# Patient Record
(demographics unavailable — no encounter records)

---

## 2024-10-18 NOTE — HISTORY OF PRESENT ILLNESS
[FreeTextEntry1] : Rianna Traore presented to the office today for a followup evaluation.  She was last seen in the office 6 months ago.     She is now 90 years old, with a history of hypertension. She has had reactions to various blood pressure medications, not all of which she remembers. She has been on Micardis without difficulty. In early 2014, she was started on diltiazem for some additional blood pressure control, though she was not compliant.  She has a history of breast cancer treated with lumpectomy and radiation therapy. She has a history of a thyroid nodule, treated with radioactive iodine. She has a history of an abnormal EKG and paroxysmal tachycardia, diagnosed for some time as WPW syndrome, presenting initially in 2005. She was advised at that time to have an ablation, but followup evaluations with a cardiologist were not remarkable, and she was not given that advice again.  She presented to [facility] in March, 2014 having been doing reasonably well. She developed the sudden onset of a sense of tachycardia which did not go away after about 60 minutes. In the emergency department she was administered adenosine, which converted her back to sinus rhythm. I was asked to see her in consultation in the hospital, and she followed up in the office as well.  Because of her presentation, and because she had some evidence for possible preexcitation on her resting EKG, I sent her to electrophysiology. 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 She was placed on verapamil.    I saw her in the office in February 2023, noting that she had been in the emergency department recently.  She did not feel palpitations the way she usually has.  Because she felt unwell, and eventually did feel some acceleration in her heart rate, she called an ambulance.  She was found to be in a sinus rhythm without abnormalities.  She followed up here.  I felt that she was not having any unstable arrhythmias.  I did know that her blood pressure was elevated.  I recommended an echocardiogram and an extended Holter.  Her monitor revealed up to 10 seconds of SVT, 23 runs in total.  Average heart rate during her longest SVT was 127 bpm.  The fastest lasted only 9 beats, at a heart rate of 182 bpm.   She was seen in the office in June, 2024 and reported several issues.  She had stopped verapamil because of concerns about a rash.  There was an episode of SVT after a longer walk, and I asked her to resume it.  Her dose of Micardis was reduced so as to avoid hypotension.  She presents to the office today having been feeling well.  She reports no chest discomfort or shortness of breath suggestive of angina.  She denies orthopnea, PND.  She has a longstanding tendency toward lower extremity edema, largely on the basis of venous insufficiency.  She typically will use compression socks, but not when the weather is warm.   She has not had any clinically evident episodes of SVT.  She has been seeing a neurologist recently because of some memory issues, and was started on Aricept 5 mg daily, with plans to increase it up to 10.  There are plans to consider the addition of Namenda after that, pending her clinical course.  There were concerns about the potential that it might interact with her SVT and verapamil, so she comes to the office today for evaluation.

## 2024-10-18 NOTE — REASON FOR VISIT
[Consultation] : a consultation regarding [Hypertension] : hypertension [Palpitations] : palpitations [Supraventricular Tachycardia] : supraventricular tachycardia

## 2024-10-18 NOTE — PHYSICAL EXAM
[General Appearance - Well Developed] : well developed [Normal Appearance] : normal appearance [Well Groomed] : well groomed [General Appearance - Well Nourished] : well nourished [No Deformities] : no deformities [General Appearance - In No Acute Distress] : no acute distress [Normal Conjunctiva] : the conjunctiva exhibited no abnormalities [Eyelids - No Xanthelasma] : the eyelids demonstrated no xanthelasmas [Normal Oral Mucosa] : normal oral mucosa [No Oral Pallor] : no oral pallor [No Oral Cyanosis] : no oral cyanosis [Normal Jugular Venous A Waves Present] : normal jugular venous A waves present [Normal Jugular Venous V Waves Present] : normal jugular venous V waves present [No Jugular Venous Fish A Waves] : no jugular venous fish A waves [Respiration, Rhythm And Depth] : normal respiratory rhythm and effort [Exaggerated Use Of Accessory Muscles For Inspiration] : no accessory muscle use [Auscultation Breath Sounds / Voice Sounds] : lungs were clear to auscultation bilaterally [Abdomen Soft] : soft [Abdomen Tenderness] : non-tender [Abdomen Mass (___ Cm)] : no abdominal mass palpated [Abnormal Walk] : normal gait [Gait - Sufficient For Exercise Testing] : the gait was sufficient for exercise testing [Nail Clubbing] : no clubbing of the fingernails [Cyanosis, Localized] : no localized cyanosis [Petechial Hemorrhages (___cm)] : no petechial hemorrhages [Skin Color & Pigmentation] : normal skin color and pigmentation [] : no rash [No Venous Stasis] : no venous stasis [Skin Lesions] : no skin lesions [No Skin Ulcers] : no skin ulcer [No Xanthoma] : no  xanthoma was observed [Oriented To Time, Place, And Person] : oriented to person, place, and time [Affect] : the affect was normal [Mood] : the mood was normal [No Anxiety] : not feeling anxious [Not Palpable] : not palpable [Normal Rate] : normal [Premature Beats] : regular with premature beats [Normal S1] : normal S1 [Normal S2] : normal S2 [No Gallop] : no gallop heard [II] : a grade 2 [1+] : left 1+ [2+] : left 2+ [___ +] : bilateral [unfilled]U+ pretibial pitting edema [Rt] : varicose veins of the right leg noted [Lt] : varicose veins of the left leg noted [FreeTextEntry1] : Red, raised and excoriated region and the right ankle. [S3] : no S3 [S4] : no S4 [Right Carotid Bruit] : no bruit heard over the right carotid [Left Carotid Bruit] : no bruit heard over the left carotid [Right Femoral Bruit] : no bruit heard over the right femoral artery [Left Femoral Bruit] : no bruit heard over the left femoral artery [Bruit] : no bruit heard

## 2024-10-18 NOTE — DISCUSSION/SUMMARY
[FreeTextEntry1] : Mrs. Le has a history of SVT.  She takes verapamil, and overall has had good control over her tendency toward SVT.  She has had no documented recurrences over the last couple of years.  She is without signs or symptoms of significant arrhythmia, heart failure and ischemia.     Echocardiography was performed December 12, 2023.  This revealed a normal ejection fraction.  The aortic root was mildly dilated, and the ascending aorta was dilated, measuring 4.5 cm.  There was mild mitral and minimal aortic regurgitation.  MRA of the chest was performed December 14, 2023.  This revealed prominent measurements of the ascending aorta measuring up to 4.4 cm.  The pulmonary artery was prominent.   She will continue verapamil.  I think that for now, I would not worry about potential interactions with Aricept, and that she can continue, and increase if necessary.  She has an appointment to see me in December, and we will reevaluate her dose of verapamil, and her burden of SVT, at that time. [EKG obtained to assist in diagnosis and management of assessed problem(s)] : EKG obtained to assist in diagnosis and management of assessed problem(s)

## 2024-12-17 NOTE — HISTORY OF PRESENT ILLNESS
[FreeTextEntry1] : Rianna Traore presented to the office today for a followup evaluation.  She was last seen in the office 6 months ago.     She is now 90 years old, with a history of hypertension. She has had reactions to various blood pressure medications, not all of which she remembers. She has been on Micardis without difficulty. In early 2014, she was started on diltiazem for some additional blood pressure control, though she was not compliant.  She has a history of breast cancer treated with lumpectomy and radiation therapy. She has a history of a thyroid nodule, treated with radioactive iodine. She has a history of an abnormal EKG and paroxysmal tachycardia, diagnosed for some time as WPW syndrome, presenting initially in 2005. She was advised at that time to have an ablation, but followup evaluations with a cardiologist were not remarkable, and she was not given that advice again.  She presented to [facility] in March, 2014 having been doing reasonably well. She developed the sudden onset of a sense of tachycardia which did not go away after about 60 minutes. In the emergency department she was administered adenosine, which converted her back to sinus rhythm. I was asked to see her in consultation in the hospital, and she followed up in the office as well.  Because of her presentation, and because she had some evidence for possible preexcitation on her resting EKG, I sent her to electrophysiology. 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 She was placed on verapamil.    I saw her in the office in February 2023, noting that she had been in the emergency department recently.  She did not feel palpitations the way she usually has.  Because she felt unwell, and eventually did feel some acceleration in her heart rate, she called an ambulance.  She was found to be in a sinus rhythm without abnormalities.  She followed up here.  I felt that she was not having any unstable arrhythmias.  I did know that her blood pressure was elevated.  I recommended an echocardiogram and an extended Holter.  Her monitor revealed up to 10 seconds of SVT, 23 runs in total.  Average heart rate during her longest SVT was 127 bpm.  The fastest lasted only 9 beats, at a heart rate of 182 bpm.   She was seen in the office in June, 2024 and reported several issues.  She had stopped verapamil because of concerns about a rash.  There was an episode of SVT after a longer walk, and I asked her to resume it.  Her dose of Micardis was reduced so as to avoid hypotension.  She had been seeing neurology, with medications to be started for some cognitive decline.  She presents to the office today having been feeling well.  She reports no chest discomfort or shortness of breath suggestive of angina.  She denies orthopnea, PND.  She has a longstanding tendency toward lower extremity edema, largely on the basis of venous insufficiency.  She typically will use compression socks, but not when the weather is warm.   She has not had any clinically evident episodes of SVT.  She has been seeing a neurologist recently because of some memory issues, and was started on Aricept.  There are plans to consider the addition of Namenda after that, pending her clinical course.  There were concerns about the potential that it might interact with her SVT and verapamil, so she comes to the office today for evaluation.

## 2024-12-17 NOTE — DISCUSSION/SUMMARY
[EKG obtained to assist in diagnosis and management of assessed problem(s)] : EKG obtained to assist in diagnosis and management of assessed problem(s) [FreeTextEntry1] : Mrs. Le has a history of SVT.  She takes verapamil, and overall has had good control over her tendency toward SVT.  She has had no documented recurrences over the last couple of years.  She is without signs or symptoms of significant arrhythmia, heart failure and ischemia.     Echocardiography was performed December 12, 2023.  This revealed a normal ejection fraction.  The aortic root was mildly dilated, and the ascending aorta was dilated, measuring 4.5 cm.  There was mild mitral and minimal aortic regurgitation.  MRA of the chest was performed December 14, 2023.  This revealed prominent measurements of the ascending aorta measuring up to 4.4 cm.  The pulmonary artery was prominent.   She will continue verapamil.  I think that for now, I would not worry about potential interactions with Aricept, and that she can continue, and increase if necessary.  She will see me in 3 months and we will reevaluate her dose of verapamil, and her burden of SVT, at that time.

## 2024-12-17 NOTE — PHYSICAL EXAM
[General Appearance - Well Developed] : well developed [Normal Appearance] : normal appearance [Well Groomed] : well groomed [General Appearance - Well Nourished] : well nourished [No Deformities] : no deformities [General Appearance - In No Acute Distress] : no acute distress [Normal Conjunctiva] : the conjunctiva exhibited no abnormalities [Eyelids - No Xanthelasma] : the eyelids demonstrated no xanthelasmas [Normal Oral Mucosa] : normal oral mucosa [No Oral Pallor] : no oral pallor [No Oral Cyanosis] : no oral cyanosis [Normal Jugular Venous A Waves Present] : normal jugular venous A waves present [Normal Jugular Venous V Waves Present] : normal jugular venous V waves present [No Jugular Venous Fish A Waves] : no jugular venous fish A waves [Respiration, Rhythm And Depth] : normal respiratory rhythm and effort [Exaggerated Use Of Accessory Muscles For Inspiration] : no accessory muscle use [Auscultation Breath Sounds / Voice Sounds] : lungs were clear to auscultation bilaterally [Abdomen Soft] : soft [Abdomen Tenderness] : non-tender [Abdomen Mass (___ Cm)] : no abdominal mass palpated [Abnormal Walk] : normal gait [Gait - Sufficient For Exercise Testing] : the gait was sufficient for exercise testing [Nail Clubbing] : no clubbing of the fingernails [Cyanosis, Localized] : no localized cyanosis [Petechial Hemorrhages (___cm)] : no petechial hemorrhages [Skin Color & Pigmentation] : normal skin color and pigmentation [] : no rash [No Venous Stasis] : no venous stasis [No Skin Ulcers] : no skin ulcer [Skin Lesions] : no skin lesions [No Xanthoma] : no  xanthoma was observed [Oriented To Time, Place, And Person] : oriented to person, place, and time [Affect] : the affect was normal [Mood] : the mood was normal [No Anxiety] : not feeling anxious [Not Palpable] : not palpable [Normal Rate] : normal [Premature Beats] : regular with premature beats [Normal S1] : normal S1 [Normal S2] : normal S2 [No Gallop] : no gallop heard [II] : a grade 2 [1+] : left 1+ [2+] : left 2+ [___ +] : bilateral [unfilled]U+ pretibial pitting edema [Rt] : varicose veins of the right leg noted [Lt] : varicose veins of the left leg noted [FreeTextEntry1] : Red, raised and excoriated region and the right ankle. [S3] : no S3 [S4] : no S4 [Right Carotid Bruit] : no bruit heard over the right carotid [Left Carotid Bruit] : no bruit heard over the left carotid [Right Femoral Bruit] : no bruit heard over the right femoral artery [Left Femoral Bruit] : no bruit heard over the left femoral artery [Bruit] : no bruit heard

## 2025-03-20 NOTE — PHYSICAL EXAM
[General Appearance - Well Developed] : well developed [Normal Appearance] : normal appearance [Well Groomed] : well groomed [General Appearance - Well Nourished] : well nourished [No Deformities] : no deformities [Normal Conjunctiva] : the conjunctiva exhibited no abnormalities [General Appearance - In No Acute Distress] : no acute distress [Eyelids - No Xanthelasma] : the eyelids demonstrated no xanthelasmas [Normal Oral Mucosa] : normal oral mucosa [No Oral Pallor] : no oral pallor [No Oral Cyanosis] : no oral cyanosis [Normal Jugular Venous A Waves Present] : normal jugular venous A waves present [Normal Jugular Venous V Waves Present] : normal jugular venous V waves present [No Jugular Venous Fish A Waves] : no jugular venous fish A waves [Exaggerated Use Of Accessory Muscles For Inspiration] : no accessory muscle use [Respiration, Rhythm And Depth] : normal respiratory rhythm and effort [Auscultation Breath Sounds / Voice Sounds] : lungs were clear to auscultation bilaterally [Abdomen Soft] : soft [Abdomen Tenderness] : non-tender [Abdomen Mass (___ Cm)] : no abdominal mass palpated [Abnormal Walk] : normal gait [Gait - Sufficient For Exercise Testing] : the gait was sufficient for exercise testing [Nail Clubbing] : no clubbing of the fingernails [Cyanosis, Localized] : no localized cyanosis [Petechial Hemorrhages (___cm)] : no petechial hemorrhages [Skin Color & Pigmentation] : normal skin color and pigmentation [] : no rash [No Venous Stasis] : no venous stasis [Skin Lesions] : no skin lesions [No Skin Ulcers] : no skin ulcer [No Xanthoma] : no  xanthoma was observed [Oriented To Time, Place, And Person] : oriented to person, place, and time [Affect] : the affect was normal [Mood] : the mood was normal [No Anxiety] : not feeling anxious [Not Palpable] : not palpable [Normal Rate] : normal [Premature Beats] : regular with premature beats [Normal S1] : normal S1 [Normal S2] : normal S2 [No Gallop] : no gallop heard [II] : a grade 2 [1+] : left 1+ [2+] : left 2+ [___ +] : bilateral [unfilled]U+ pretibial pitting edema [Rt] : varicose veins of the right leg noted [Lt] : varicose veins of the left leg noted [FreeTextEntry1] : Red, raised and excoriated region and the right ankle. [S3] : no S3 [S4] : no S4 [Right Carotid Bruit] : no bruit heard over the right carotid [Left Carotid Bruit] : no bruit heard over the left carotid [Right Femoral Bruit] : no bruit heard over the right femoral artery [Left Femoral Bruit] : no bruit heard over the left femoral artery [Bruit] : no bruit heard

## 2025-03-20 NOTE — DISCUSSION/SUMMARY
[FreeTextEntry1] : Mrs. Le has a history of SVT.  She takes verapamil, and overall has had good control over her tendency toward SVT.  She has had no documented recurrences over the last couple of years.  She is without signs or symptoms of significant arrhythmia, heart failure and ischemia.     Echocardiography was performed December 12, 2023.  This revealed a normal ejection fraction.  The aortic root was mildly dilated, and the ascending aorta was dilated, measuring 4.5 cm.  There was mild mitral and minimal aortic regurgitation.  MRA of the chest was performed December 14, 2023.  This revealed prominent measurements of the ascending aorta measuring up to 4.4 cm.  The pulmonary artery was prominent.   She will continue verapamil.    As she is off medications for her memory, we do not have the same concerns about drug interactions.  She will call if there is a consideration to resume them.  Otherwise, she will plan to see me in 6 months. [EKG obtained to assist in diagnosis and management of assessed problem(s)] : EKG obtained to assist in diagnosis and management of assessed problem(s)

## 2025-03-20 NOTE — HISTORY OF PRESENT ILLNESS
[FreeTextEntry1] : Rianna Traore presented to the office today for a followup evaluation.  She was last seen in the office 3 months ago.     She is now 90 years old, with a history of hypertension. She has had reactions to various blood pressure medications, not all of which she remembers. She has been on Micardis without difficulty. In early 2014, she was started on diltiazem for some additional blood pressure control, though she was not compliant.  She has a history of breast cancer treated with lumpectomy and radiation therapy. She has a history of a thyroid nodule, treated with radioactive iodine. She has a history of an abnormal EKG and paroxysmal tachycardia, diagnosed for some time as WPW syndrome, presenting initially in 2005. She was advised at that time to have an ablation, but followup evaluations with a cardiologist were not remarkable, and she was not given that advice again.  She presented to [facility] in March, 2014 having been doing reasonably well. She developed the sudden onset of a sense of tachycardia which did not go away after about 60 minutes. In the emergency department she was administered adenosine, which converted her back to sinus rhythm. I was asked to see her in consultation in the hospital, and she followed up in the office as well.  Because of her presentation, and because she had some evidence for possible preexcitation on her resting EKG, I sent her to electrophysiology. 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 She was placed on verapamil.    I saw her in the office in February 2023, noting that she had been in the emergency department recently.  She did not feel palpitations the way she usually has.  Because she felt unwell, and eventually did feel some acceleration in her heart rate, she called an ambulance.  She was found to be in a sinus rhythm without abnormalities.  She followed up here.  I felt that she was not having any unstable arrhythmias.  I did know that her blood pressure was elevated.  I recommended an echocardiogram and an extended Holter.  Her monitor revealed up to 10 seconds of SVT, 23 runs in total.  Average heart rate during her longest SVT was 127 bpm.  The fastest lasted only 9 beats, at a heart rate of 182 bpm.   She was seen in the office in June, 2024 and reported several issues.  She had stopped verapamil because of concerns about a rash.  There was an episode of SVT after a longer walk, and I asked her to resume it.  Her dose of Micardis was reduced so as to avoid hypotension.  She had been seeing neurology, with medications to be started for some cognitive decline.  I saw her in the office in December, 2024, feeling generally well at that time.  She had not had any clinically evident episodes of SVT.  She had concerns about the possibility of adding Namenda, and the use of Aricept, given her SVT, and use of verapamil.  I did not feel the changes were necessary at that time.    She presents to the office today having been feeling well.  She reports no chest discomfort or shortness of breath suggestive of angina.  She denies orthopnea, PND.  She has a longstanding tendency toward lower extremity edema, largely on the basis of venous insufficiency.  She typically will use compression socks, but not when the weather is warm.   She has not had any clinically evident episodes of SVT.  She has been seeing a neurologist recently because of some memory issues. She changed to a different neurologist, and she is now off the Aricept and Namenda.

## 2025-04-01 NOTE — ASSESSMENT
[FreeTextEntry1] : will observe. sonogram next visit.  bloods drawn. to call next week for results. patient has been given the opportunity to ask questions, and all of the patient's questions have been answered to their satisfaction.  to return earlier if any change.

## 2025-04-01 NOTE — HISTORY OF PRESENT ILLNESS
[de-identified] : prior evaluation of thyroid nodules. cytologically benign.  denies any symptoms related.  history of RT exposure near [place], and history of FLOYD treatment.  recent sonogram essentially stable. I have reviewed all old and new data and available images. Additional information was obtained from others present at the time of visit to ensure the completeness of the history

## 2025-04-01 NOTE — PHYSICAL EXAM
[de-identified] : 3 cm left thyroid nodule, well circumscribed and mobile [Laryngoscopy Performed] : laryngoscopy was performed, see procedure section for findings [Midline] : located in midline position [Normal] : orientation to person, place, and time: normal